# Patient Record
Sex: FEMALE | Race: WHITE | NOT HISPANIC OR LATINO | Employment: FULL TIME | URBAN - METROPOLITAN AREA
[De-identification: names, ages, dates, MRNs, and addresses within clinical notes are randomized per-mention and may not be internally consistent; named-entity substitution may affect disease eponyms.]

---

## 2023-11-17 ENCOUNTER — APPOINTMENT (OUTPATIENT)
Dept: RADIOLOGY | Facility: HOSPITAL | Age: 58
End: 2023-11-17
Payer: COMMERCIAL

## 2023-11-20 ENCOUNTER — HOSPITAL ENCOUNTER (OUTPATIENT)
Dept: RADIOLOGY | Facility: HOSPITAL | Age: 58
Discharge: HOME | End: 2023-11-20
Payer: COMMERCIAL

## 2023-11-20 DIAGNOSIS — Z12.31 ENCOUNTER FOR SCREENING MAMMOGRAM FOR MALIGNANT NEOPLASM OF BREAST: ICD-10-CM

## 2023-11-20 PROCEDURE — 77063 BREAST TOMOSYNTHESIS BI: CPT | Performed by: RADIOLOGY

## 2023-11-20 PROCEDURE — 77063 BREAST TOMOSYNTHESIS BI: CPT

## 2023-11-20 PROCEDURE — 77067 SCR MAMMO BI INCL CAD: CPT | Performed by: RADIOLOGY

## 2023-11-20 PROCEDURE — 77067 SCR MAMMO BI INCL CAD: CPT

## 2024-04-11 ENCOUNTER — HOSPITAL ENCOUNTER (OUTPATIENT)
Dept: RADIOLOGY | Facility: HOSPITAL | Age: 59
Discharge: HOME | End: 2024-04-11
Payer: COMMERCIAL

## 2024-04-11 DIAGNOSIS — R06.02 SOB (SHORTNESS OF BREATH): ICD-10-CM

## 2024-04-11 PROCEDURE — 71046 X-RAY EXAM CHEST 2 VIEWS: CPT

## 2024-04-11 PROCEDURE — 71046 X-RAY EXAM CHEST 2 VIEWS: CPT | Performed by: RADIOLOGY

## 2024-05-17 ENCOUNTER — HOSPITAL ENCOUNTER (OUTPATIENT)
Dept: RADIOLOGY | Facility: CLINIC | Age: 59
Discharge: HOME | End: 2024-05-17
Payer: COMMERCIAL

## 2024-05-17 DIAGNOSIS — Z78.0 ASYMPTOMATIC MENOPAUSAL STATE: ICD-10-CM

## 2024-05-17 PROCEDURE — 77080 DXA BONE DENSITY AXIAL: CPT

## 2024-05-17 PROCEDURE — 77080 DXA BONE DENSITY AXIAL: CPT | Performed by: RADIOLOGY

## 2024-06-05 ENCOUNTER — TELEPHONE (OUTPATIENT)
Dept: GASTROENTEROLOGY | Facility: CLINIC | Age: 59
End: 2024-06-05
Payer: COMMERCIAL

## 2024-07-31 ENCOUNTER — APPOINTMENT (OUTPATIENT)
Dept: GASTROENTEROLOGY | Facility: CLINIC | Age: 59
End: 2024-07-31
Payer: COMMERCIAL

## 2024-07-31 VITALS
WEIGHT: 155 LBS | HEIGHT: 63 IN | BODY MASS INDEX: 27.46 KG/M2 | OXYGEN SATURATION: 93 % | DIASTOLIC BLOOD PRESSURE: 76 MMHG | SYSTOLIC BLOOD PRESSURE: 124 MMHG | HEART RATE: 80 BPM

## 2024-07-31 DIAGNOSIS — R10.11 RUQ PAIN: ICD-10-CM

## 2024-07-31 DIAGNOSIS — R10.13 EPIGASTRIC PAIN: ICD-10-CM

## 2024-07-31 DIAGNOSIS — K21.9 GASTROESOPHAGEAL REFLUX DISEASE, UNSPECIFIED WHETHER ESOPHAGITIS PRESENT: Primary | ICD-10-CM

## 2024-07-31 PROCEDURE — 3008F BODY MASS INDEX DOCD: CPT | Performed by: NURSE PRACTITIONER

## 2024-07-31 PROCEDURE — 99204 OFFICE O/P NEW MOD 45 MIN: CPT | Performed by: NURSE PRACTITIONER

## 2024-07-31 RX ORDER — MEDROXYPROGESTERONE ACETATE 2.5 MG/1
TABLET ORAL
COMMUNITY
Start: 2024-06-07

## 2024-07-31 RX ORDER — FLUTICASONE PROPIONATE 220 UG/1
2 AEROSOL, METERED RESPIRATORY (INHALATION) 2 TIMES DAILY
COMMUNITY
Start: 2022-10-27

## 2024-07-31 RX ORDER — THYROID 90 MG/1
TABLET ORAL
COMMUNITY
Start: 2022-11-04

## 2024-07-31 RX ORDER — LORATADINE 10 MG/1
10 TABLET ORAL
COMMUNITY

## 2024-07-31 RX ORDER — SCOLOPAMINE TRANSDERMAL SYSTEM 1 MG/1
PATCH, EXTENDED RELEASE TRANSDERMAL
COMMUNITY

## 2024-07-31 RX ORDER — FAMOTIDINE 20 MG/1
20 TABLET, FILM COATED ORAL NIGHTLY
COMMUNITY

## 2024-07-31 RX ORDER — TRAZODONE HYDROCHLORIDE 50 MG/1
TABLET ORAL
COMMUNITY

## 2024-07-31 RX ORDER — PANTOPRAZOLE SODIUM 40 MG/1
TABLET, DELAYED RELEASE ORAL
COMMUNITY
Start: 2024-04-04 | End: 2024-07-31

## 2024-07-31 RX ORDER — THYROID, PORCINE 15 MG/1
15 TABLET ORAL
COMMUNITY
Start: 2022-12-29

## 2024-07-31 RX ORDER — FLUTICASONE PROPIONATE 50 MCG
SPRAY, SUSPENSION (ML) NASAL
COMMUNITY
Start: 2022-09-01

## 2024-07-31 RX ORDER — EPINEPHRINE 0.3 MG/.3ML
INJECTION SUBCUTANEOUS
COMMUNITY
Start: 2024-04-01

## 2024-07-31 RX ORDER — OMEPRAZOLE 20 MG/1
CAPSULE, DELAYED RELEASE ORAL
COMMUNITY
Start: 2023-08-15 | End: 2024-07-31 | Stop reason: WASHOUT

## 2024-07-31 RX ORDER — ESOMEPRAZOLE MAGNESIUM 40 MG/1
40 CAPSULE, DELAYED RELEASE ORAL
Qty: 30 CAPSULE | Refills: 3 | Status: SHIPPED | OUTPATIENT
Start: 2024-07-31

## 2024-07-31 RX ORDER — IBUPROFEN 200 MG
1 TABLET ORAL EVERY 24 HOURS
COMMUNITY
Start: 2024-07-18 | End: 2024-10-16

## 2024-07-31 ASSESSMENT — ENCOUNTER SYMPTOMS
DIZZINESS: 0
COUGH: 0
CONFUSION: 0
SORE THROAT: 0
CHILLS: 0
ROS GI COMMENTS: SEE HPI
ABDOMINAL PAIN: 1
ADENOPATHY: 0
JOINT SWELLING: 0
SHORTNESS OF BREATH: 0
FEVER: 0
ARTHRALGIAS: 0
PALPITATIONS: 0
WEAKNESS: 0
DIFFICULTY URINATING: 0
TROUBLE SWALLOWING: 0
WOUND: 0
BRUISES/BLEEDS EASILY: 0

## 2024-07-31 NOTE — H&P (VIEW-ONLY)
Subjective   Patient ID: Sarahy Navarro is a 58 y.o. female with PMH of hypothyroidism, asthma, anxiety, cluster headaches, and GERD  who was referred by PCP for GERD and Abdominal Pain (epigastric).     Patient's PCP is Antonio Nazario MD       Patient reports reflux and epigastric pain for months. She also has nausea but no vomiting. She has a lot of belching. Symptoms triggered by food, especially alcohol, spicy foods, tomato sauces, and salad. She takes protonix 40mg daily and famotidine 20mg daily. Medications are not helping much. She otherwise denies dysphagia and melena.     She also has diarrhea aggravated by salad, spicy foods, and tomato sauces. No rectal bleeding or lower abdominal pain.     She questions if her gallbladder could be an issue. Multiple family members have had gallbladder issues in the past. HIDA scan from 2021 was normal.     Recent cologuard 5/6/2024 was normal     Summary of endoscopies:  - EGD 2010: normal per patient     Social Hx:  Tobacco: 1/2 ppd   Etoh: social   Recreational drug use: none  NSAIDs: none       Family Hx:  No GI malignancy, IBD, or pancreatitis     Review of Systems:  Review of Systems   Constitutional:  Negative for chills and fever.   HENT:  Negative for sore throat and trouble swallowing.    Respiratory:  Negative for cough and shortness of breath.    Cardiovascular:  Negative for chest pain and palpitations.   Gastrointestinal:  Positive for abdominal pain.        SEE HPI   Endocrine: Negative for cold intolerance and heat intolerance.   Genitourinary:  Negative for difficulty urinating.   Musculoskeletal:  Negative for arthralgias and joint swelling.   Skin:  Negative for rash and wound.   Neurological:  Negative for dizziness and weakness.   Hematological:  Negative for adenopathy. Does not bruise/bleed easily.   Psychiatric/Behavioral:  Negative for confusion.         Medications:  Prior to Admission medications    Medication Sig Start Date End Date Taking?  Authorizing Provider   Jaimee Thyroid 15 mg tablet Take 1 tablet (15 mg) by mouth. 12/29/22  Yes Historical Provider, MD   EPINEPHrine 0.3 mg/0.3 mL injection syringe Use as directed for allergic reaction Inject 0.3 mg into the muscle as needed Use as directed for allergic reaction 4/1/24  Yes Historical Provider, MD   fluticasone (Flonase) 50 mcg/actuation nasal spray USE 1 SPRAY IN EACH NOSTRIL ONCE EVERY DAY 9/1/22  Yes Historical Provider, MD   fluticasone (Flovent) 220 mcg/actuation inhaler Inhale 2 puffs 2 times a day. 10/27/22  Yes Historical Provider, MD   loratadine (Claritin) 10 mg tablet Take 1 tablet (10 mg) by mouth once daily.   Yes Historical Provider, MD   medroxyPROGESTERone (Provera) 2.5 mg tablet  6/7/24  Yes Historical Provider, MD   nicotine (Nicoderm CQ) 14 mg/24 hr patch Place 1 patch on the skin once every 24 hours. 7/18/24 10/16/24 Yes Historical Provider, MD   scopolamine (Transderm-Scop) 1 mg over 3 days patch 3 day APPLY 1 PATCH TOPICALLY TO THE SKIN EVERY 72 HOURS AS NEEDED FOR NAUSEA   Yes Historical Provider, MD   thyroid, pork, ([place]) 90 mg tablet Take by mouth. 11/4/22  Yes Historical Provider, MD   traZODone (Desyrel) 50 mg tablet    Yes Historical Provider, MD   omeprazole (PriLOSEC) 20 mg DR capsule  8/15/23 7/31/24 Yes Historical Provider, MD   pantoprazole (ProtoNix) 40 mg EC tablet Take by mouth. 4/4/24 7/31/24 Yes Historical Provider, MD   esomeprazole (NexIUM) 40 mg DR capsule Take 1 capsule (40 mg) by mouth once daily in the morning. Take before meals. Do not open capsule. 7/31/24   Velia Prater, APRN-CNP   famotidine (Pepcid) 20 mg tablet Take 1 tablet (20 mg) by mouth once daily at bedtime.    Historical Provider, MD       Allergies:  Amoxicillin-pot clavulanate; Fexofenadine hcl; Bee venom protein (honey bee); Codeine; Latex, natural rubber; Ceftriaxone; Cetirizine; and Sulfa (sulfonamide antibiotics)    Past Medical History:  She has a past medical history of  Personal history of other endocrine, nutritional and metabolic disease.    Past Surgical History:  She has a past surgical history that includes Other surgical history (07/15/2020) and Other surgical history (07/15/2020).    Social History:  She reports that she has been smoking cigarettes. She has never used smokeless tobacco. She reports current alcohol use. She reports that she does not use drugs.    Objective   Physical exam:  Physical Exam  Constitutional:       General: She is not in acute distress.     Appearance: Normal appearance.   HENT:      Mouth/Throat:      Mouth: Mucous membranes are moist.      Comments: pink  Eyes:      Conjunctiva/sclera: Conjunctivae normal.      Pupils: Pupils are equal, round, and reactive to light.   Cardiovascular:      Rate and Rhythm: Normal rate and regular rhythm.      Heart sounds: No murmur heard.  Pulmonary:      Effort: Pulmonary effort is normal.      Breath sounds: Normal breath sounds.   Abdominal:      General: Bowel sounds are normal. There is no distension.      Palpations: Abdomen is soft.      Tenderness: There is abdominal tenderness. There is no guarding.   Skin:     General: Skin is warm and dry.      Coloration: Skin is not jaundiced.   Neurological:      Mental Status: She is alert and oriented to person, place, and time.   Psychiatric:         Mood and Affect: Mood normal.         Behavior: Behavior normal.          Assessment/Plan     GERD, abdominal pain, diarrhea   GERD symptoms and upper abdominal pain despite pantoprazole and famotidine. Has previously tried omeprazole. At one point was on dexilant which worked very well but insurance wouldn't cover. Will try switching to esomeprazole 40mg daily. Will check RUQ US to r/o gallstones as pt is concerned gb could be causing issues; HIDA in 2021 was normal. Schedule for EGD for symptoms despite appropriate medical therapy.          Velia Prater, APRN-CNP

## 2024-07-31 NOTE — PATIENT INSTRUCTIONS
Thank you for coming to your appointment   - schedule your ultrasound; call [phone] to schedule or use Connect to schedule  - We will schedule you for an EGD in the endoscopy center ; follow the instructions for that   - We will try nexium (esomeprazole) 40mg once daily. I have sent this to [facility] in [place]   - Follow up after procedure     Please call [phone] with any questions or concerns       If you utilize Connect messages, please understand that these are intended to be used for simple and straightforward medical questions. If you have a more complex question or numerous complaints, an office appointment may be needed.

## 2024-07-31 NOTE — PROGRESS NOTES
Subjective   Patient ID: Sarahy Navarro is a 58 y.o. female with PMH of hypothyroidism, asthma, anxiety, cluster headaches, and GERD  who was referred by PCP for GERD and Abdominal Pain (epigastric).     Patient's PCP is Antonio Nazario MD       Patient reports reflux and epigastric pain for months. She also has nausea but no vomiting. She has a lot of belching. Symptoms triggered by food, especially alcohol, spicy foods, tomato sauces, and salad. She takes protonix 40mg daily and famotidine 20mg daily. Medications are not helping much. She otherwise denies dysphagia and melena.     She also has diarrhea aggravated by salad, spicy foods, and tomato sauces. No rectal bleeding or lower abdominal pain.     She questions if her gallbladder could be an issue. Multiple family members have had gallbladder issues in the past. HIDA scan from 2021 was normal.     Recent cologuard 5/6/2024 was normal     Summary of endoscopies:  - EGD 2010: normal per patient     Social Hx:  Tobacco: 1/2 ppd   Etoh: social   Recreational drug use: none  NSAIDs: none       Family Hx:  No GI malignancy, IBD, or pancreatitis     Review of Systems:  Review of Systems   Constitutional:  Negative for chills and fever.   HENT:  Negative for sore throat and trouble swallowing.    Respiratory:  Negative for cough and shortness of breath.    Cardiovascular:  Negative for chest pain and palpitations.   Gastrointestinal:  Positive for abdominal pain.        SEE HPI   Endocrine: Negative for cold intolerance and heat intolerance.   Genitourinary:  Negative for difficulty urinating.   Musculoskeletal:  Negative for arthralgias and joint swelling.   Skin:  Negative for rash and wound.   Neurological:  Negative for dizziness and weakness.   Hematological:  Negative for adenopathy. Does not bruise/bleed easily.   Psychiatric/Behavioral:  Negative for confusion.         Medications:  Prior to Admission medications    Medication Sig Start Date End Date Taking?  Authorizing Provider   Jaimee Thyroid 15 mg tablet Take 1 tablet (15 mg) by mouth. 12/29/22  Yes Historical Provider, MD   EPINEPHrine 0.3 mg/0.3 mL injection syringe Use as directed for allergic reaction Inject 0.3 mg into the muscle as needed Use as directed for allergic reaction 4/1/24  Yes Historical Provider, MD   fluticasone (Flonase) 50 mcg/actuation nasal spray USE 1 SPRAY IN EACH NOSTRIL ONCE EVERY DAY 9/1/22  Yes Historical Provider, MD   fluticasone (Flovent) 220 mcg/actuation inhaler Inhale 2 puffs 2 times a day. 10/27/22  Yes Historical Provider, MD   loratadine (Claritin) 10 mg tablet Take 1 tablet (10 mg) by mouth once daily.   Yes Historical Provider, MD   medroxyPROGESTERone (Provera) 2.5 mg tablet  6/7/24  Yes Historical Provider, MD   nicotine (Nicoderm CQ) 14 mg/24 hr patch Place 1 patch on the skin once every 24 hours. 7/18/24 10/16/24 Yes Historical Provider, MD   scopolamine (Transderm-Scop) 1 mg over 3 days patch 3 day APPLY 1 PATCH TOPICALLY TO THE SKIN EVERY 72 HOURS AS NEEDED FOR NAUSEA   Yes Historical Provider, MD   thyroid, pork, ([place]) 90 mg tablet Take by mouth. 11/4/22  Yes Historical Provider, MD   traZODone (Desyrel) 50 mg tablet    Yes Historical Provider, MD   omeprazole (PriLOSEC) 20 mg DR capsule  8/15/23 7/31/24 Yes Historical Provider, MD   pantoprazole (ProtoNix) 40 mg EC tablet Take by mouth. 4/4/24 7/31/24 Yes Historical Provider, MD   esomeprazole (NexIUM) 40 mg DR capsule Take 1 capsule (40 mg) by mouth once daily in the morning. Take before meals. Do not open capsule. 7/31/24   Velia Prater, APRN-CNP   famotidine (Pepcid) 20 mg tablet Take 1 tablet (20 mg) by mouth once daily at bedtime.    Historical Provider, MD       Allergies:  Amoxicillin-pot clavulanate; Fexofenadine hcl; Bee venom protein (honey bee); Codeine; Latex, natural rubber; Ceftriaxone; Cetirizine; and Sulfa (sulfonamide antibiotics)    Past Medical History:  She has a past medical history of  Personal history of other endocrine, nutritional and metabolic disease.    Past Surgical History:  She has a past surgical history that includes Other surgical history (07/15/2020) and Other surgical history (07/15/2020).    Social History:  She reports that she has been smoking cigarettes. She has never used smokeless tobacco. She reports current alcohol use. She reports that she does not use drugs.    Objective   Physical exam:  Physical Exam  Constitutional:       General: She is not in acute distress.     Appearance: Normal appearance.   HENT:      Mouth/Throat:      Mouth: Mucous membranes are moist.      Comments: pink  Eyes:      Conjunctiva/sclera: Conjunctivae normal.      Pupils: Pupils are equal, round, and reactive to light.   Cardiovascular:      Rate and Rhythm: Normal rate and regular rhythm.      Heart sounds: No murmur heard.  Pulmonary:      Effort: Pulmonary effort is normal.      Breath sounds: Normal breath sounds.   Abdominal:      General: Bowel sounds are normal. There is no distension.      Palpations: Abdomen is soft.      Tenderness: There is abdominal tenderness. There is no guarding.   Skin:     General: Skin is warm and dry.      Coloration: Skin is not jaundiced.   Neurological:      Mental Status: She is alert and oriented to person, place, and time.   Psychiatric:         Mood and Affect: Mood normal.         Behavior: Behavior normal.          Assessment/Plan     GERD, abdominal pain, diarrhea   GERD symptoms and upper abdominal pain despite pantoprazole and famotidine. Has previously tried omeprazole. At one point was on dexilant which worked very well but insurance wouldn't cover. Will try switching to esomeprazole 40mg daily. Will check RUQ US to r/o gallstones as pt is concerned gb could be causing issues; HIDA in 2021 was normal. Schedule for EGD for symptoms despite appropriate medical therapy.          Velia Prater, APRN-CNP

## 2024-08-01 ENCOUNTER — HOSPITAL ENCOUNTER (OUTPATIENT)
Dept: RADIOLOGY | Facility: HOSPITAL | Age: 59
Discharge: HOME | End: 2024-08-01
Payer: COMMERCIAL

## 2024-08-01 DIAGNOSIS — R10.13 EPIGASTRIC PAIN: ICD-10-CM

## 2024-08-01 DIAGNOSIS — R10.11 RUQ PAIN: ICD-10-CM

## 2024-08-01 DIAGNOSIS — K21.9 GASTROESOPHAGEAL REFLUX DISEASE, UNSPECIFIED WHETHER ESOPHAGITIS PRESENT: ICD-10-CM

## 2024-08-01 PROCEDURE — 76705 ECHO EXAM OF ABDOMEN: CPT

## 2024-08-01 PROCEDURE — 76705 ECHO EXAM OF ABDOMEN: CPT | Performed by: RADIOLOGY

## 2024-08-18 ENCOUNTER — PREP FOR PROCEDURE (OUTPATIENT)
Dept: GASTROENTEROLOGY | Facility: CLINIC | Age: 59
End: 2024-08-18
Payer: COMMERCIAL

## 2024-08-18 RX ORDER — SODIUM CHLORIDE, SODIUM LACTATE, POTASSIUM CHLORIDE, CALCIUM CHLORIDE 600; 310; 30; 20 MG/100ML; MG/100ML; MG/100ML; MG/100ML
20 INJECTION, SOLUTION INTRAVENOUS CONTINUOUS
Status: CANCELLED | OUTPATIENT
Start: 2024-08-18

## 2024-08-19 ENCOUNTER — ANESTHESIA EVENT (OUTPATIENT)
Dept: GASTROENTEROLOGY | Facility: HOSPITAL | Age: 59
End: 2024-08-19
Payer: COMMERCIAL

## 2024-08-20 ENCOUNTER — ANESTHESIA (OUTPATIENT)
Dept: GASTROENTEROLOGY | Facility: HOSPITAL | Age: 59
End: 2024-08-20
Payer: COMMERCIAL

## 2024-08-20 ENCOUNTER — HOSPITAL ENCOUNTER (OUTPATIENT)
Dept: GASTROENTEROLOGY | Facility: HOSPITAL | Age: 59
Discharge: HOME | End: 2024-08-20
Payer: COMMERCIAL

## 2024-08-20 VITALS
WEIGHT: 159 LBS | HEIGHT: 63 IN | HEART RATE: 53 BPM | DIASTOLIC BLOOD PRESSURE: 65 MMHG | BODY MASS INDEX: 28.17 KG/M2 | TEMPERATURE: 98.5 F | SYSTOLIC BLOOD PRESSURE: 119 MMHG | RESPIRATION RATE: 16 BRPM | OXYGEN SATURATION: 95 %

## 2024-08-20 DIAGNOSIS — R10.11 RUQ PAIN: ICD-10-CM

## 2024-08-20 DIAGNOSIS — R10.13 EPIGASTRIC PAIN: ICD-10-CM

## 2024-08-20 DIAGNOSIS — K21.9 GASTROESOPHAGEAL REFLUX DISEASE, UNSPECIFIED WHETHER ESOPHAGITIS PRESENT: ICD-10-CM

## 2024-08-20 PROBLEM — T88.59XA DELAYED EMERGENCE FROM ANESTHESIA: Status: ACTIVE | Noted: 2024-08-20

## 2024-08-20 PROCEDURE — 43239 EGD BIOPSY SINGLE/MULTIPLE: CPT | Performed by: INTERNAL MEDICINE

## 2024-08-20 PROCEDURE — 2500000005 HC RX 250 GENERAL PHARMACY W/O HCPCS: Performed by: NURSE ANESTHETIST, CERTIFIED REGISTERED

## 2024-08-20 PROCEDURE — 3700000001 HC GENERAL ANESTHESIA TIME - INITIAL BASE CHARGE

## 2024-08-20 PROCEDURE — 7100000010 HC PHASE TWO TIME - EACH INCREMENTAL 1 MINUTE

## 2024-08-20 PROCEDURE — 7100000009 HC PHASE TWO TIME - INITIAL BASE CHARGE

## 2024-08-20 PROCEDURE — 2500000004 HC RX 250 GENERAL PHARMACY W/ HCPCS (ALT 636 FOR OP/ED): Performed by: NURSE ANESTHETIST, CERTIFIED REGISTERED

## 2024-08-20 PROCEDURE — 2500000004 HC RX 250 GENERAL PHARMACY W/ HCPCS (ALT 636 FOR OP/ED): Performed by: INTERNAL MEDICINE

## 2024-08-20 PROCEDURE — 3700000002 HC GENERAL ANESTHESIA TIME - EACH INCREMENTAL 1 MINUTE

## 2024-08-20 RX ORDER — PROPOFOL 10 MG/ML
INJECTION, EMULSION INTRAVENOUS AS NEEDED
Status: DISCONTINUED | OUTPATIENT
Start: 2024-08-20 | End: 2024-08-20

## 2024-08-20 RX ORDER — LIDOCAINE HYDROCHLORIDE 20 MG/ML
INJECTION, SOLUTION EPIDURAL; INFILTRATION; INTRACAUDAL; PERINEURAL AS NEEDED
Status: DISCONTINUED | OUTPATIENT
Start: 2024-08-20 | End: 2024-08-20

## 2024-08-20 RX ORDER — FENTANYL CITRATE 50 UG/ML
INJECTION, SOLUTION INTRAMUSCULAR; INTRAVENOUS AS NEEDED
Status: DISCONTINUED | OUTPATIENT
Start: 2024-08-20 | End: 2024-08-20

## 2024-08-20 SDOH — HEALTH STABILITY: MENTAL HEALTH: CURRENT SMOKER: 1

## 2024-08-20 ASSESSMENT — PAIN SCALES - GENERAL
PAINLEVEL_OUTOF10: 2
PAINLEVEL_OUTOF10: 0 - NO PAIN
PAIN_LEVEL: 0
PAINLEVEL_OUTOF10: 0 - NO PAIN
PAINLEVEL_OUTOF10: 0 - NO PAIN

## 2024-08-20 ASSESSMENT — PAIN - FUNCTIONAL ASSESSMENT
PAIN_FUNCTIONAL_ASSESSMENT: 0-10

## 2024-08-20 ASSESSMENT — COLUMBIA-SUICIDE SEVERITY RATING SCALE - C-SSRS: 1. IN THE PAST MONTH, HAVE YOU WISHED YOU WERE DEAD OR WISHED YOU COULD GO TO SLEEP AND NOT WAKE UP?: NO

## 2024-08-20 NOTE — ADDENDUM NOTE
Encounter addended by: Lachelle Avendano RN on: 8/20/2024 2:56 PM   Actions taken: Check Out activity completed

## 2024-08-20 NOTE — ANESTHESIA PREPROCEDURE EVALUATION
Patient: Sarahy Navarro    Procedure Information       Anesthesia Start Date/Time: 08/20/24 1116    Scheduled providers: Fred Graves MD    Procedure: EGD    Location: [facility]            Relevant Problems   Anesthesia   (+) Delayed emergence from anesthesia      Cardiac (within normal limits)      Pulmonary (within normal limits)      Neuro (within normal limits)      GI   (+) Gastroesophageal reflux disease      /Renal (within normal limits)      Liver (within normal limits)      Hematology (within normal limits)      HEENT (within normal limits)       Clinical information reviewed:   Tobacco  Allergies  Meds   Med Hx  Surg Hx   Fam Hx  Soc Hx        NPO Detail:  NPO/Void Status  Date of Last Liquid: 08/19/24  Date of Last Solid: 08/19/24         Physical Exam    Airway  Mallampati: II  TM distance: >3 FB  Neck ROM: full     Cardiovascular - normal exam  Rhythm: regular     Dental - normal exam     Pulmonary - normal exam     Abdominal - normal exam             Anesthesia Plan    History of general anesthesia?: yes  History of complications of general anesthesia?: no    ASA 2     MAC     The patient is a current smoker.  Patient was previously instructed to abstain from smoking on day of procedure.  Patient did not smoke on day of procedure.    intravenous induction   Anesthetic plan and risks discussed with patient.

## 2024-08-20 NOTE — ANESTHESIA POSTPROCEDURE EVALUATION
Patient: Sarahy Navarro    Procedure Summary       Date: 08/20/24 Room / Location: [facility]    Anesthesia Start: 1116 Anesthesia Stop: 1139    Procedure: EGD Diagnosis:       Gastroesophageal reflux disease, unspecified whether esophagitis present      RUQ pain      Epigastric pain    Scheduled Providers: Fred Graves MD Responsible Provider: CLAYTON Le    Anesthesia Type: MAC ASA Status: 2            Anesthesia Type: MAC    Vitals Value Taken Time   /65 08/20/24 1158   Temp 36.9 °C (98.5 °F) 08/20/24 1158   Pulse 53 08/20/24 1158   Resp 16 08/20/24 1158   SpO2 95 % 08/20/24 1158       Anesthesia Post Evaluation    Patient location during evaluation: bedside  Patient participation: complete - patient participated  Level of consciousness: awake and alert  Pain score: 0  Pain management: adequate  Airway patency: patent  Cardiovascular status: acceptable and hemodynamically stable  Respiratory status: acceptable  Hydration status: acceptable  Postoperative Nausea and Vomiting: none        There were no known notable events for this encounter.

## 2024-08-21 NOTE — ADDENDUM NOTE
Encounter addended by: Maggie Etienne RN on: 8/21/2024 1:26 PM   Actions taken: Contacts section saved, Flowsheet accepted

## 2024-08-26 LAB
LABORATORY COMMENT REPORT: NORMAL
PATH REPORT.FINAL DX SPEC: NORMAL
PATH REPORT.GROSS SPEC: NORMAL
PATH REPORT.RELEVANT HX SPEC: NORMAL
PATH REPORT.TOTAL CANCER: NORMAL

## 2024-11-25 ENCOUNTER — TELEPHONE (OUTPATIENT)
Dept: GASTROENTEROLOGY | Facility: CLINIC | Age: 59
End: 2024-11-25
Payer: COMMERCIAL

## 2024-11-25 DIAGNOSIS — R10.13 EPIGASTRIC PAIN: ICD-10-CM

## 2024-11-25 DIAGNOSIS — K21.9 GASTROESOPHAGEAL REFLUX DISEASE, UNSPECIFIED WHETHER ESOPHAGITIS PRESENT: ICD-10-CM

## 2024-11-25 DIAGNOSIS — R10.11 RUQ PAIN: ICD-10-CM

## 2024-11-26 RX ORDER — ESOMEPRAZOLE MAGNESIUM 40 MG/1
40 CAPSULE, DELAYED RELEASE ORAL
Qty: 90 CAPSULE | Refills: 3 | Status: SHIPPED | OUTPATIENT
Start: 2024-11-26